# Patient Record
Sex: FEMALE | Race: WHITE | NOT HISPANIC OR LATINO | Employment: FULL TIME | URBAN - METROPOLITAN AREA
[De-identification: names, ages, dates, MRNs, and addresses within clinical notes are randomized per-mention and may not be internally consistent; named-entity substitution may affect disease eponyms.]

---

## 2017-11-17 LAB
INFLUENZA A ANTIGEN, POC: POSITIVE
INFLUENZA B ANTIGEN, POC: NEGATIVE

## 2019-04-22 ENCOUNTER — HISTORICAL (OUTPATIENT)
Dept: ADMINISTRATIVE | Facility: HOSPITAL | Age: 24
End: 2019-04-22

## 2019-04-22 LAB
GLUCOSE 1H P 100 G GLC PO SERPL-MCNC: 168 MG/DL (ref 100–180)
GLUCOSE 2H P 100 G GLC PO SERPL-MCNC: 156 MG/DL (ref 70–140)
GLUCOSE 3H P 100 G GLC PO SERPL-MCNC: 132 MG/DL (ref 70–115)
GLUCOSE BS SERPL-MCNC: 78 MG/DL (ref 70–115)

## 2020-06-15 ENCOUNTER — HISTORICAL (OUTPATIENT)
Dept: URGENT CARE | Facility: CLINIC | Age: 25
End: 2020-06-15

## 2020-06-15 LAB — RAPID GROUP A STREP (OHS): NEGATIVE

## 2020-07-01 ENCOUNTER — HISTORICAL (OUTPATIENT)
Dept: ADMINISTRATIVE | Facility: HOSPITAL | Age: 25
End: 2020-07-01

## 2020-07-01 LAB
GLUCOSE 1H P 100 G GLC PO SERPL-MCNC: 144 MG/DL (ref 100–180)
GLUCOSE 2H P 100 G GLC PO SERPL-MCNC: 148 MG/DL (ref 70–140)
GLUCOSE 3H P 100 G GLC PO SERPL-MCNC: 140 MG/DL (ref 70–115)
GLUCOSE BS SERPL-MCNC: 73 MG/DL (ref 70–115)

## 2022-04-11 ENCOUNTER — HISTORICAL (OUTPATIENT)
Dept: ADMINISTRATIVE | Facility: HOSPITAL | Age: 27
End: 2022-04-11

## 2022-04-24 VITALS
SYSTOLIC BLOOD PRESSURE: 133 MMHG | OXYGEN SATURATION: 98 % | BODY MASS INDEX: 46.65 KG/M2 | DIASTOLIC BLOOD PRESSURE: 88 MMHG | WEIGHT: 280 LBS | HEIGHT: 65 IN

## 2022-09-21 ENCOUNTER — HISTORICAL (OUTPATIENT)
Dept: ADMINISTRATIVE | Facility: HOSPITAL | Age: 27
End: 2022-09-21

## 2023-01-27 PROBLEM — G43.009 MIGRAINE WITHOUT AURA AND WITHOUT STATUS MIGRAINOSUS, NOT INTRACTABLE: Status: ACTIVE | Noted: 2023-01-27

## 2023-02-17 PROBLEM — F41.1 GAD (GENERALIZED ANXIETY DISORDER): Status: ACTIVE | Noted: 2023-02-17

## 2023-07-15 ENCOUNTER — OFFICE VISIT (OUTPATIENT)
Dept: URGENT CARE | Facility: CLINIC | Age: 28
End: 2023-07-15
Payer: COMMERCIAL

## 2023-07-15 VITALS
HEART RATE: 103 BPM | SYSTOLIC BLOOD PRESSURE: 145 MMHG | OXYGEN SATURATION: 98 % | RESPIRATION RATE: 17 BRPM | BODY MASS INDEX: 48.82 KG/M2 | HEIGHT: 65 IN | TEMPERATURE: 99 F | WEIGHT: 293 LBS | DIASTOLIC BLOOD PRESSURE: 81 MMHG

## 2023-07-15 DIAGNOSIS — J32.9 BACTERIAL SINUSITIS: Primary | ICD-10-CM

## 2023-07-15 DIAGNOSIS — B96.89 BACTERIAL SINUSITIS: Primary | ICD-10-CM

## 2023-07-15 PROCEDURE — 96372 THER/PROPH/DIAG INJ SC/IM: CPT | Mod: ,,, | Performed by: FAMILY MEDICINE

## 2023-07-15 PROCEDURE — 99203 PR OFFICE/OUTPT VISIT, NEW, LEVL III, 30-44 MIN: ICD-10-PCS | Mod: 25,,, | Performed by: FAMILY MEDICINE

## 2023-07-15 PROCEDURE — 96372 PR INJECTION,THERAP/PROPH/DIAG2ST, IM OR SUBCUT: ICD-10-PCS | Mod: ,,, | Performed by: FAMILY MEDICINE

## 2023-07-15 PROCEDURE — 99203 OFFICE O/P NEW LOW 30 MIN: CPT | Mod: 25,,, | Performed by: FAMILY MEDICINE

## 2023-07-15 RX ORDER — DEXAMETHASONE SODIUM PHOSPHATE 100 MG/10ML
10 INJECTION INTRAMUSCULAR; INTRAVENOUS
Status: COMPLETED | OUTPATIENT
Start: 2023-07-15 | End: 2023-07-15

## 2023-07-15 RX ORDER — AMOXICILLIN AND CLAVULANATE POTASSIUM 875; 125 MG/1; MG/1
1 TABLET, FILM COATED ORAL EVERY 12 HOURS
Qty: 14 TABLET | Refills: 0 | Status: SHIPPED | OUTPATIENT
Start: 2023-07-15 | End: 2023-07-22

## 2023-07-15 RX ADMIN — DEXAMETHASONE SODIUM PHOSPHATE 10 MG: 100 INJECTION INTRAMUSCULAR; INTRAVENOUS at 11:07

## 2023-07-15 NOTE — PROGRESS NOTES
"Subjective:      Patient ID: Katie Dance Broussard is a 28 y.o. female.    Vitals:  height is 5' 5.35" (1.66 m) and weight is 139.7 kg (308 lb) (abnormal). Her temperature is 99 °F (37.2 °C). Her blood pressure is 145/81 (abnormal) and her pulse is 103. Her respiration is 17 and oxygen saturation is 98%.     Chief Complaint: Sinus Problem (NC, loss of voice(x1w), face pressure x2w scratchy throat( x this morning)/Denies fever, BA, cough/Denies swabbing )    28-year-old female presents to clinic complaining of a 2 week history of sinus pressure congestion loss of voice and scratchy throat with a mild cough due to postnasal drip.  Patient denies any fever shortness of breath vomiting or diarrhea.  Has been taking any combo Mucinex medication but does not feel that has helped.      Constitution: Negative.   HENT:  Positive for congestion and sinus pressure.    Cardiovascular: Negative.    Eyes: Negative.    Respiratory:  Positive for cough.    Gastrointestinal: Negative.    Genitourinary: Negative.    Musculoskeletal: Negative.    Skin: Negative.    Allergic/Immunologic: Negative.    Neurological: Negative.    Hematologic/Lymphatic: Negative.     Objective:     Physical Exam   Constitutional: She is oriented to person, place, and time. She appears well-developed. She is cooperative.  Non-toxic appearance. She does not appear ill. No distress.   HENT:   Head: Normocephalic and atraumatic.   Ears:   Right Ear: Hearing and external ear normal.   Left Ear: Hearing and external ear normal.   Mouth/Throat: Oropharynx is clear and moist and mucous membranes are normal. No posterior oropharyngeal erythema (postnasal drip.  Maxillary sinus tenderness on palpation).   Eyes: Conjunctivae and lids are normal.   Neck: Trachea normal and phonation normal. Neck supple. No edema present. No erythema present. No neck rigidity present.   Cardiovascular: Normal rate.   Pulmonary/Chest: Effort normal and breath sounds normal. No stridor. " "No respiratory distress. She has no decreased breath sounds. She has no wheezes. She has no rhonchi. She has no rales.   Abdominal: Normal appearance.   Lymphadenopathy:     She has no cervical adenopathy.   Neurological: She is alert and oriented to person, place, and time. She exhibits normal muscle tone. Coordination normal.   Skin: Skin is warm, dry, intact, not diaphoretic and no rash.   Psychiatric: Her speech is normal and behavior is normal. Mood, judgment and thought content normal.   Nursing note and vitals reviewed.       Previous History      Review of patient's allergies indicates:   Allergen Reactions    Azithromycin Hives       Past Medical History:   Diagnosis Date    Migraines      Current Outpatient Medications   Medication Instructions    amoxicillin-clavulanate 875-125mg (AUGMENTIN) 875-125 mg per tablet 1 tablet, Oral, Every 12 hours    eletriptan (RELPAX) 40 mg, Oral, Daily PRN, may repeat in 2 hours if necessary    meloxicam (MOBIC) 15 MG tablet TAKE 1 TABLET BY MOUTH EVERY DAY    sertraline (ZOLOFT) 100 mg, Oral, Daily     Past Surgical History:   Procedure Laterality Date    TONSILLECTOMY      WISDOM TOOTH EXTRACTION       Family History   Problem Relation Age of Onset    Breast cancer Mother     Hypertension Mother     Migraines Mother     Diabetes type II Father     Throat cancer Father     Bipolar disorder Father     Ovarian cysts Sister     Migraines Sister     ADD / ADHD Brother     No Known Problems Son     No Known Problems Son        Social History     Tobacco Use    Smoking status: Never    Smokeless tobacco: Never   Substance Use Topics    Alcohol use: Yes     Comment: Monthly    Drug use: Never        Physical Exam      Vital Signs Reviewed   BP (!) 145/81   Pulse 103   Temp 99 °F (37.2 °C)   Resp 17   Ht 5' 5.35" (1.66 m)   Wt (!) 139.7 kg (308 lb)   SpO2 98%   BMI 50.70 kg/m²        Procedures    Procedures     Labs     Results for orders placed or performed in visit on " 01/27/23   Comprehensive Metabolic Panel   Result Value Ref Range    Sodium Level 142 136 - 145 mmol/L    Potassium Level 4.7 3.5 - 5.1 mmol/L    Chloride 105 mmol/L    Carbon Dioxide 25 21 - 32 mmol/L    Glucose Level 100 74 - 106 mg/dL    Blood Urea Nitrogen 10.0 7.0 - 18.0 mg/dL    Creatinine 0.75 0.60 - 1.30 mg/dL    Calcium Level Total 9.5 8.5 - 10.1 mg/dL    Protein Total 6.9 6.4 - 8.2 gm/dL    Albumin Level 4.5 3.4 - 5.0 g/dL    Globulin 2.4 1.2 - 3.0 gm/dL    Albumin/Globulin Ratio 1.9 1.5 - 2.0 ratio    Bilirubin Total 0.4 0.2 - 1.0 mg/dL    Alkaline Phosphatase 84 38 - 126 unit/L    Alanine Aminotransferase 18 7 - 52 unit/L    Aspartate Aminotransferase 18 15 - 37 unit/L    eGFR >60 mls/min/1.73/m2   TSH   Result Value Ref Range    Thyroid Stimulating Hormone 1.631 0.350 - 4.940 uIU/mL   Lipid Panel   Result Value Ref Range    Cholesterol Total 195 0 - 200 mg/dL    HDL Cholesterol 33 (L) 35 - 60 mg/dL    Triglyceride 86 30 - 150 mg/dL    LDL Cholesterol Direct 146.0 (H) <=129.0 mg/dL   Vitamin D   Result Value Ref Range    Vit D 25 OH 17.1 (L) 30.0 - 80.0 ng/mL   CBC with Differential   Result Value Ref Range    WBC 6.8 4.5 - 11.5 x10(3)/mcL    RBC 5.35 4.20 - 5.40 x10(6)/mcL    Hgb 13.6 12.0 - 16.0 gm/dL    Hct 43.7 37.0 - 47.0 %    MCV 81.7 80.0 - 94.0 fL    MCH 25.4 pg    MCHC 31.1 (L) 33.0 - 36.0 mg/dL    RDW 13.0 11.5 - 17.0 %    Platelet 433 (H) 130 - 400 x10(3)/mcL    MPV 9.2 7.4 - 10.4 fL    Neut % 63.0 %    Lymph % 31.1 %    Mono % 5.9 %    Lymph # 2.1 0.6 - 4.6 x10(3)/mcL    Neut # 4.3 2.1 - 9.2 x10(3)/mcL    Mono # 0.4 0.1 - 1.3 x10(3)/mcL       Assessment:     1. Bacterial sinusitis        Plan:   Medications sent to pharmacy  Start taking an allergy pill daily such as claritin, zyrtec, allegrea or xyzal. Also start using a nasal steroid spray such as flonase or nasacort daily. If you are not being treated for high blood pressure, you can also take decongestant such as sudafed as needed. They  can be purchased over the counter. If oral steroids were prescribed, start them tomorrow morning. Monitor for fever. Take tylenol/acetaminophen or ibuprofen as needed. Rest and hydrate. If symptoms persist or worsen, return to clinic or seek medical attention immediately.       Bacterial sinusitis    Other orders  -     dexAMETHasone injection 10 mg  -     amoxicillin-clavulanate 875-125mg (AUGMENTIN) 875-125 mg per tablet; Take 1 tablet by mouth every 12 (twelve) hours. for 7 days  Dispense: 14 tablet; Refill: 0

## 2023-07-15 NOTE — PATIENT INSTRUCTIONS
Medications sent to pharmacy  Start taking an allergy pill daily such as claritin, zyrtec, allegrea or xyzal. Also start using a nasal steroid spray such as flonase or nasacort daily. If you are not being treated for high blood pressure, you can also take decongestant such as sudafed as needed. They can be purchased over the counter. If oral steroids were prescribed, start them tomorrow morning. Monitor for fever. Take tylenol/acetaminophen or ibuprofen as needed. Rest and hydrate. If symptoms persist or worsen, return to clinic or seek medical attention immediately.

## 2023-10-01 PROBLEM — F90.0 ATTENTION DEFICIT HYPERACTIVITY DISORDER (ADHD), PREDOMINANTLY INATTENTIVE TYPE: Status: ACTIVE | Noted: 2023-10-01

## 2024-02-02 PROBLEM — E78.00 HYPERCHOLESTEROLEMIA: Status: ACTIVE | Noted: 2024-02-02

## 2024-04-19 PROCEDURE — 87086 URINE CULTURE/COLONY COUNT: CPT | Performed by: STUDENT IN AN ORGANIZED HEALTH CARE EDUCATION/TRAINING PROGRAM
